# Patient Record
Sex: FEMALE | URBAN - METROPOLITAN AREA
[De-identification: names, ages, dates, MRNs, and addresses within clinical notes are randomized per-mention and may not be internally consistent; named-entity substitution may affect disease eponyms.]

---

## 2018-10-16 ENCOUNTER — OFFICE VISIT (OUTPATIENT)
Dept: OTHER | Facility: IMAGING CENTER | Age: 28
End: 2018-10-16
Payer: OTHER GOVERNMENT

## 2018-10-16 VITALS
WEIGHT: 134.48 LBS | RESPIRATION RATE: 14 BRPM | SYSTOLIC BLOOD PRESSURE: 112 MMHG | HEART RATE: 76 BPM | BODY MASS INDEX: 22.96 KG/M2 | DIASTOLIC BLOOD PRESSURE: 80 MMHG | OXYGEN SATURATION: 99 % | HEIGHT: 64 IN | TEMPERATURE: 97.9 F

## 2018-10-16 DIAGNOSIS — Z00.00 HEALTHCARE MAINTENANCE: ICD-10-CM

## 2018-10-16 DIAGNOSIS — H91.93 DECREASED HEARING OF BOTH EARS: ICD-10-CM

## 2018-10-16 PROCEDURE — 99204 OFFICE O/P NEW MOD 45 MIN: CPT | Performed by: INTERNAL MEDICINE

## 2018-10-16 ASSESSMENT — PATIENT HEALTH QUESTIONNAIRE - PHQ9: CLINICAL INTERPRETATION OF PHQ2 SCORE: 0

## 2018-10-16 ASSESSMENT — PAIN SCALES - GENERAL: PAINLEVEL: NO PAIN

## 2018-10-16 NOTE — ASSESSMENT & PLAN NOTE
"There are no preventive care reminders to display for this patient.  Patient brings in a CD with her health records and immunization status.  Has not had a Pap    Lifestyle  Exercise: Sports and walking her dog every afternoon  Diet: Patient states it has been difficult eating here as she is mostly having a typical American diet.  She has gained weight since moving here.  When she was living in [place] she was eating more fruits and vegetables.  See [facility] intake form for details.  Sleep: -  Relaxation: -  Relationships:  \"I have a great family\" living with her mother-in-law and has been together with their dog, Santi. Active in her [employer] \"[employer]\" and has many friends there  Current Stressors:  is getting stressed with what job he will accept (possibly moving back to [place]). So it stresses her a bit.  Prior experience with complementary therapies: Acupuncture  "

## 2018-10-16 NOTE — PROGRESS NOTES
"Chief Complaint   Patient presents with   • Hearing Problem     for many years   • Gastrophageal Reflux     Jearisse Phoenix is a 28 y.o. female who presents today to establish care at [facility] and to discuss hearing problem and reflux.    HPI:  Patient recently moved from [facility] to [facility] with her . She has been in overall good health.      Complains of hearing loss which she feels has been going on for several years.  Symptoms are mostly worse on the left side.  Do not appear to be worsening.  Denies ear pain, injury or history of recurrent ear infections.  Occasional tinnitus.  Describes hearing loss as feeling of sound being \"blocked\".  Occasionally has ear popping sensation but usually when she drives and pyramid like or on airplanes.      Also reports past history of acid reflux.  Reports episode occurring twice when she had lived in [place].  No specific triggering foods or events.  Notes that during both episodes she was likely dehydrated.  She was given a medication in [place] which she does not recall the name of.  Symptoms resolved and she is had no recurrences recently.  States that her sister does have a history of acid reflux due to drinking coffee.    Healthcare maintenance  There are no preventive care reminders to display for this patient.  Patient brings in a CD with her health records and immunization status.  Has not had a Pap    Lifestyle  Exercise: Sports and walking her dog every afternoon  Diet: Patient states it has been difficult eating here as she is mostly having a typical American diet.  She has gained weight since moving here.  When she was living in [place] she was eating more fruits and vegetables.  See [facility] intake form for details.  Sleep: -  Relaxation: -  Relationships:  \"I have a great family\" living with her mother-in-law and has been together with their dog, Santi. Active in her [employer] \"[employer]\" and has many friends there  Current Stressors:  " is getting stressed with what job he will accept (possibly moving back to [place]). So it stresses her a bit.  Prior experience with complementary therapies: Acupuncture    Depression Screening  Little interest or pleasure in doing things?  0 - not at all  Feeling down, depressed , or hopeless? 0 - not at all  Patient Health Questionnaire Score: 0    If depressive symptoms identified deferred to follow up visit unless specifically addressed in assesment and plan.    Interpretation of PHQ-9 Total Score   Score Severity   1-4 Minimal Depression   5-9 Mild Depression   10-14 Moderate Depression   15-19 Moderately Severe Depression   20-27 Severe Depression    Current medicines (including changes today)  No current outpatient prescriptions on file.     No current facility-administered medications for this visit.      She  has a past medical history of GERD (gastroesophageal reflux disease).  She  has no past surgical history on file.  Social History   Substance Use Topics   • Smoking status: Never Smoker   • Smokeless tobacco: Never Used   • Alcohol use No     Family History   Problem Relation Age of Onset   • Hypertension Mother    • Stroke Mother 40        Mild stroke   • Other Father         scoliosis   • Lung Disease Father         smoker   • GI Sister         acid reflux   • Heart Attack Maternal Grandmother 60   • Heart Attack Paternal Grandfather 40   • Hypertension Maternal Aunt    • Hypertension Maternal Aunt      No family status information on file.     Social History     Social History Narrative   • No narrative on file       ROS  Constitutional: Negative for fever, chills, weight loss and malaise/fatigue.   HENT: Negative for ear pain, nosebleeds, congestion, sore throat and neck pain.  Hearing problem as per hpi  Eyes: Negative for blurred vision.   Respiratory: Negative for cough, sputum production, shortness of breath and wheezing.    Cardiovascular: Negative for chest pain, palpitations, orthopnea and leg  "swelling.   Gastrointestinal: Negative for heartburn currently, nausea, vomiting and abdominal pain.  Genitourinary: Negative for dysuria, urgency and frequency.   Musculoskeletal: Negative for myalgias, back pain and joint pain.   Skin: Negative for rash and itching.   Neurological: Negative for dizziness, tingling, tremors, sensory change, focal weakness and headaches.   Endo/Heme/Allergies: Does not bruise/bleed easily.   Psychiatric/Behavioral: Negative for depression, anxiety, or memory loss.     All other systems reviewed and are negative except as in HPI.     Objective:   Blood pressure 112/80, pulse 76, temperature 36.6 °C (97.9 °F), resp. rate 14, height 1.626 m (5' 4\"), weight 61 kg (134 lb 7.7 oz), last menstrual period 09/30/2018, SpO2 99 %. Body mass index is 23.08 kg/m².    Physical Exam:  Constitutional: Alert, no distress.  Skin: Warm, dry, good turgor, no rashes in visible areas.  Eye: Equal, round and reactive, conjunctiva clear, lids normal.  ENMT: Lips without lesions, good dentition, oropharynx clear. Pinna normal. TM pearly gray.  Neck: Trachea midline, no masses, no thyromegaly.  Respiratory: Unlabored respiratory effort, lungs clear to auscultation, no wheezes, no ronchi.  Cardiovascular: Normal S1, S2, no murmur, no edema.  Abdomen: Soft, non-tender, no masses, no hepatosplenomegaly.  Psych: Alert and oriented x3, normal affect and mood.    Assessment and Plan:   The following treatment plan was discussed  1. Decreased hearing of both ears  REFERRAL TO AUDIOLOGY   2. Healthcare maintenance  LIPID PROFILE    CBC WITH DIFFERENTIAL    COMP METABOLIC PANEL     1.  Advised formal hearing evaluation with audiology. May have mild eustachian tube dysfunction due to changes in elevation.   2. Discussed CDC recommendations for immunizations and USPSTF guidelines for screening exams.  Patient advised to schedule PAP and screening cholesterol, fasting sugar labs. Advised attending Food is Medicine " lectures - intro lecture.    Records requested.  Followup: Return in about 3 months (around 1/16/2019) for follow-up with PCP.    Spent 45 minutes in face-to-tace patient contact in which greater than 50% of the visit was spent in counseling and coordination of care including discussion about integrative primary care focus on preventative health and wellness, pathophysiology about GERD and common causes and triggers.   We also reviewed PMH, family history, and each of her chronic diagnoses in regards to current management, specialty physicians, symptom control, and future planning.  Please refer to assessment and plan/discussion/recommendations for additional details.        I have worked with consultants from the vendor as well as technical experts from [employer] to optimize the interface. I have made every reasonable attempt to correct obvious errors, but I expect that there are errors of grammar and possibly content that I did not discover before finalizing the note.

## 2018-10-19 DIAGNOSIS — Z00.00 HEALTHCARE MAINTENANCE: ICD-10-CM

## 2018-10-19 DIAGNOSIS — E78.5 DYSLIPIDEMIA: ICD-10-CM

## 2018-10-22 ENCOUNTER — TELEPHONE (OUTPATIENT)
Dept: OTHER | Facility: IMAGING CENTER | Age: 28
End: 2018-10-22

## 2018-10-22 NOTE — TELEPHONE ENCOUNTER
----- Message from Awilda Irvin D.O. sent at 10/19/2018  4:44 PM PDT -----  Cholesterol showing elevated LDL - lifestyle modifications can help to lower cardiovascular disease risk.  This includes eating a heart-healthy diet, regular aerobic exercises, maintenance of desirable body weight and avoidance of tobacco products.  Watch out for foods with excess saturated fat (found in meats that come from a cow or pig, and in creams, cheeses, butter, claros, and fried foods). Recommend more vegetables, fruits, fish, nuts, olive oil and exercising 5 times a week for 30 minutes. Please attend Food is Medicine lectures. Plan to repeat labs in 4-6 months - lab ordered. Other labs look okay.

## 2018-10-30 ENCOUNTER — HOSPITAL ENCOUNTER (OUTPATIENT)
Dept: RADIOLOGY | Facility: MEDICAL CENTER | Age: 28
End: 2018-10-30